# Patient Record
Sex: MALE | Race: WHITE | NOT HISPANIC OR LATINO | Employment: FULL TIME | URBAN - METROPOLITAN AREA
[De-identification: names, ages, dates, MRNs, and addresses within clinical notes are randomized per-mention and may not be internally consistent; named-entity substitution may affect disease eponyms.]

---

## 2018-01-11 ENCOUNTER — HOSPITAL ENCOUNTER (OUTPATIENT)
Dept: CT IMAGING | Facility: CLINIC | Age: 43
Discharge: HOME | End: 2018-01-11
Admitting: RADIOLOGY
Payer: COMMERCIAL

## 2018-01-11 DIAGNOSIS — R03.0 ELEVATED BLOOD PRESSURE READING: ICD-10-CM

## 2018-01-11 PROCEDURE — 75571 CT HRT W/O DYE W/CA TEST: CPT

## 2018-01-11 PROCEDURE — 75571 CT HRT W/O DYE W/CA TEST: CPT | Mod: 26 | Performed by: INTERNAL MEDICINE

## 2018-01-17 NOTE — ADDENDUM NOTE
Encounter addended by: Jocelyn Worrell on: 1/17/2018  1:48 PM<BR>     Actions taken: Sign clinical note

## 2020-03-02 ENCOUNTER — HEALTH MAINTENANCE LETTER (OUTPATIENT)
Age: 45
End: 2020-03-02

## 2023-10-27 ENCOUNTER — OFFICE VISIT (OUTPATIENT)
Dept: FAMILY MEDICINE | Facility: CLINIC | Age: 48
End: 2023-10-27

## 2023-10-27 VITALS
DIASTOLIC BLOOD PRESSURE: 98 MMHG | WEIGHT: 274 LBS | RESPIRATION RATE: 20 BRPM | TEMPERATURE: 97.3 F | BODY MASS INDEX: 35.16 KG/M2 | HEIGHT: 74 IN | HEART RATE: 68 BPM | SYSTOLIC BLOOD PRESSURE: 136 MMHG

## 2023-10-27 DIAGNOSIS — Z01.818 PREOPERATIVE EXAMINATION: Primary | ICD-10-CM

## 2023-10-27 PROBLEM — Z76.89 HEALTH CARE HOME: Status: ACTIVE | Noted: 2023-10-27

## 2023-10-27 PROBLEM — Z71.89 ACP (ADVANCE CARE PLANNING): Status: ACTIVE | Noted: 2023-10-27

## 2023-10-27 LAB
ERYTHROCYTE [DISTWIDTH] IN BLOOD BY AUTOMATED COUNT: 12.1 %
HCT VFR BLD AUTO: 48.2 % (ref 40–53)
HEMOGLOBIN: 15.9 G/DL (ref 13.3–17.7)
MCH RBC QN AUTO: 31.8 PG (ref 26–33)
MCHC RBC AUTO-ENTMCNC: 33 G/DL (ref 31–36)
MCV RBC AUTO: 96.4 FL (ref 78–100)
PLATELET COUNT - QUEST: 156 10^9/L (ref 150–375)
RBC # BLD AUTO: 5 10*12/L (ref 4.4–5.9)
WBC # BLD AUTO: 6.6 10*9/L (ref 4–11)

## 2023-10-27 PROCEDURE — 80048 BASIC METABOLIC PNL TOTAL CA: CPT | Mod: 90

## 2023-10-27 PROCEDURE — 36415 COLL VENOUS BLD VENIPUNCTURE: CPT

## 2023-10-27 PROCEDURE — 90715 TDAP VACCINE 7 YRS/> IM: CPT

## 2023-10-27 PROCEDURE — 85027 COMPLETE CBC AUTOMATED: CPT

## 2023-10-27 PROCEDURE — 99204 OFFICE O/P NEW MOD 45 MIN: CPT | Mod: 25

## 2023-10-27 PROCEDURE — 90471 IMMUNIZATION ADMIN: CPT

## 2023-10-27 RX ORDER — CHOLECALCIFEROL (VITAMIN D3) 50 MCG
1 TABLET ORAL DAILY
COMMUNITY
Start: 2023-10-27

## 2023-10-27 NOTE — NURSING NOTE
Ruslan Mcneil is here for a pre-op exam.  Questioned patient about current smoking habits.  Pt. has never smoked.  PULSE regular  My Chart: active  CLASSIFICATION OF OVERWEIGHT AND OBESITY BY BMI                        Obesity Class           BMI(kg/m2)  Underweight                                    < 18.5  Normal                                         18.5-24.9  Overweight                                     25.0-29.9  OBESITY                     I                  30.0-34.9                             II                 35.0-39.9  EXTREME OBESITY             III                >40                            Patient's  BMI Body mass index is 35.66 kg/m .  http://hin.nhlbi.nih.gov/menuplanner/menu.cgi  Pre-visit planning  Immunizations - up to date  Colonoscopy -   Mammogram -   Asthma -   PHQ9 -    KI-7 -      The patient has verbalized that it is ok to leave a detailed voice message on the patient's cell phone with results/recommendations from this visit.

## 2023-10-27 NOTE — PROGRESS NOTES
[facility]  [address]  [address]  [facility] [address]  Phone: [phone]  Fax: [phone]  Primary Provider: No primary care provider on file.  Pre-op Performing Provider: BISI YANG      PREOPERATIVE EVALUATION:  Today's date: 10/27/2023    Carrington is a 48 year old male who presents for a preoperative evaluation.    Surgical Information:  Surgery/Procedure: R knee scope  Surgery Location: [facility]  Surgeon: Dr Rogers  Surgery Date: 11/9/23  Time of Surgery: am  Where patient plans to recover: At home with family  Fax number for surgical facility: [phone]    Assessment & Plan     The proposed surgical procedure is considered INTERMEDIATE risk.    Preoperative examination  - Patient is cleared for surgery. CBC is within normal limits, BMP is pending, patient and surgeon will be notified if any abnormal results.  - VENOUS COLLECTION  - Hemogram Platelet ([facility]FP)  - BASIC METABOLIC PANEL ([facility])    Antiplatelet or Anticoagulation Medication Instructions:  -Patient is not on any chronic antiplatelet or anticoagulation medications.      Additional Medication Instructions:  -Patient was instructed to not have anything to eat or drink 12 hours before the procedure other than small sips of water. He was also instructed to stop all NSAIDs and vitamins a week before the procedure.      RECOMMENDATION:  -APPROVAL GIVEN to proceed with proposed procedure.    Subjective     Carrington presents for a preoperative examination for upcoming right knee arthroscopy. He has a history of multiple injuries to his right knee as a child playing sports, this is his fourth surgery on the right knee.     He does not have any chronic medical problems and does not take any daily medications other than supplements and vitamin D.     Past surgeries reviewed, no problems with sedation.    HPI related to upcoming procedure:     1. No - Have you ever had a heart attack or stroke?  2. No - Have you ever had surgery  on your heart or blood vessels, such as a stent, coronary (heart) bypass, or surgery on an artery in the head, neck, heart, or legs?  3. No - Do you have chest pain when you are physically active?  4. No - Do you have a history of heart failure?  5. No - Do you currently have a cold, bronchitis, or symptoms of other respiratory (head and chest) infections?  6. No - Do you have a cough, shortness of breath, or wheezing?  7. No - Do you or anyone in your family have a history of blood clots?  8. No - Do you or anyone in your family have a serious bleeding problem, such as long-lasting bleeding after surgeries or cuts?  9. Yes - Have you ever had anemia or been told to take iron pills? In his 20's, had a physical and his iron was low and then he took this for a short period of time.  10. No - Have you had any abnormal blood loss such as black, tarry or bloody stools, or abnormal vaginal bleeding?  11. No - Have you ever had a blood transfusion?  12. Yes - Are you willing to have a blood transfusion if it is medically needed before, during, or after your surgery?  13. No - Have you or anyone in your family ever had problems with anesthesia (sedation for surgery)?  14. No - Do you have sleep apnea, excessive snoring, or daytime drowsiness?   15. No - Do you have any artifical heart valves or other implanted medical devices, such as a pacemaker, defibrillator, or continuous glucose monitor?  16. No - Do you have any artifical joints?  17. No - Are you allergic to latex?  18. No - Is there any chance that you may be pregnant?    Health Care Directive:  Patient does not have a Health Care Directive or Living Will: No. Discussed advance care planning with patient; information given to patient to review.    Preoperative Review of :   reviewed - no record of controlled substances prescribed.    Review of Systems  CONSTITUTIONAL: NEGATIVE for fever, chills, change in weight  INTEGUMENTARY/SKIN: NEGATIVE for worrisome  rashes, moles or lesions  EYES: NEGATIVE for vision changes or irritation  ENT/MOUTH: NEGATIVE for ear, mouth and throat problems  RESP: NEGATIVE for significant cough or SOB  CV: NEGATIVE for chest pain, palpitations or peripheral edema  GI: NEGATIVE for nausea, abdominal pain, heartburn, or change in bowel habits  : NEGATIVE for frequency, dysuria, or hematuria  MUSCULOSKELETAL: NEGATIVE for significant arthralgias or myalgia  NEURO: NEGATIVE for weakness, dizziness or paresthesias  ENDOCRINE: NEGATIVE for temperature intolerance, skin/hair changes  HEME: NEGATIVE for bleeding problems  PSYCHIATRIC: NEGATIVE for changes in mood or affect    Patient Active Problem List    Diagnosis Date Noted     ACP (advance care planning) 10/27/2023     Priority: Medium     Health Care Home 10/27/2023     Priority: Medium     Lumbar radiculopathy 11/01/2012     Priority: Medium     Impaired fasting glucose 11/21/2011     Priority: Medium     Hypertriglyceridemia 11/21/2011     Priority: Medium      History reviewed. No pertinent past medical history.  Past Surgical History:   Procedure Laterality Date     [id number]      wisdom teeth removal, in his early 30's     ACL repacement Right     Multile ACL replacements and meniscus surgeries     Current Outpatient Medications   Medication Sig Dispense Refill     vitamin D3 (CHOLECALCIFEROL) 50 mcg (2000 units) tablet Take 1 tablet (50 mcg) by mouth daily       No Known Allergies     Social History     Tobacco Use     Smoking status: Never     Passive exposure: Past     Smokeless tobacco: Never     Tobacco comments:     very occasional, 1 cigar per month maybe   Substance Use Topics     Alcohol use: Yes     Alcohol/week: 0.0 standard drinks of alcohol     Family History   Problem Relation Age of Onset     Hypertension Mother      Hypertension Father      Cancer Maternal Grandfather         lung     History   Drug Use No         Objective   BP (!) 136/98 (BP Location: Right arm, Patient  "Position: Chair, Cuff Size: Adult Large)   Pulse 68   Temp 97.3  F (36.3  C) (Temporal)   Resp 20   Ht 1.867 m (6' 1.5\")   Wt 124.3 kg (274 lb)   BMI 35.66 kg/m      Physical Exam  GENERAL APPEARANCE: healthy, alert and no distress  EYES: EOMI,  PERRL  HENT: ear canals and TM's normal and nose and mouth without ulcers or lesions  NECK: no adenopathy, no asymmetry, masses, or scars and thyroid normal to palpation  RESP: lungs clear to auscultation - no rales, rhonchi or wheezes  CV: regular rates and rhythm, normal S1 S2, no S3 or S4 and no murmur, click or rub  ABDOMEN:  soft, nontender, no HSM or masses and bowel sounds normal  MS: extremities normal- no gross deformities noted, no evidence of inflammation in joints, FROM in all extremities.  SKIN: no suspicious lesions or rashes  NEURO: Normal strength and tone, sensory exam grossly normal, mentation intact and speech normal  PSYCH: mentation appears normal. and affect normal/bright    Diagnostics:  Recent Results (from the past 24 hour(s))   Hemogram Platelet (BFP)    Collection Time: 10/27/23 12:00 AM   Result Value Ref Range    WBC 6.6 4.0 - 11 10*9/L    RBC Count 5.00 4.4 - 5.9 10*12/L    Hemoglobin 15.9 13.3 - 17.7 g/dL    Hematocrit 48.2 40.0 - 53.0 %    MCV 96.4 78 - 100 fL    MCH 31.8 26 - 33 pg    MCHC 33.0 31 - 36 g/dL    RDW 12.1 %    Platelet Count 156 150 - 375 10^9/L      No EKG required, no history of coronary heart disease, significant arrhythmia, peripheral arterial disease or other structural heart disease.    Revised Cardiac Risk Index (RCRI):  The patient has the following serious cardiovascular risks for perioperative complications:   - No serious cardiac risks = 0 points     RCRI Interpretation: 0 points: Class I (very low risk - 0.4% complication rate)    Signed Electronically by: Tiarra Hernández PA-C  Copy of this evaluation report is provided to requesting physician.      "

## 2023-10-28 LAB
BUN SERPL-MCNC: 12 MG/DL (ref 7–25)
BUN/CREATININE RATIO: NORMAL (CALC) (ref 6–22)
CALCIUM SERPL-MCNC: 9.6 MG/DL (ref 8.6–10.3)
CHLORIDE SERPLBLD-SCNC: 104 MMOL/L (ref 98–110)
CO2 SERPL-SCNC: 22 MMOL/L (ref 20–32)
CREAT SERPL-MCNC: 1 MG/DL (ref 0.6–1.29)
EGFR (QUEST): 93 ML/MIN/1.73M2
GLUCOSE - QUEST: 74 MG/DL (ref 65–99)
POTASSIUM SERPL-SCNC: 4.2 MMOL/L (ref 3.5–5.3)
SODIUM SERPL-SCNC: 139 MMOL/L (ref 135–146)

## 2024-06-17 PROBLEM — Z76.89 HEALTH CARE HOME: Status: RESOLVED | Noted: 2023-10-27 | Resolved: 2024-06-17
